# Patient Record
Sex: FEMALE | Race: WHITE
[De-identification: names, ages, dates, MRNs, and addresses within clinical notes are randomized per-mention and may not be internally consistent; named-entity substitution may affect disease eponyms.]

---

## 2019-12-08 ENCOUNTER — HOSPITAL ENCOUNTER (INPATIENT)
Dept: HOSPITAL 54 - ER | Age: 69
LOS: 15 days | Discharge: NURSING, REHAB OR LONG-TERM CARE | DRG: 885 | End: 2019-12-23
Attending: NURSE PRACTITIONER | Admitting: PSYCHIATRY & NEUROLOGY
Payer: MEDICARE

## 2019-12-08 VITALS — HEIGHT: 64 IN | BODY MASS INDEX: 19.12 KG/M2 | WEIGHT: 112 LBS

## 2019-12-08 VITALS — SYSTOLIC BLOOD PRESSURE: 175 MMHG | DIASTOLIC BLOOD PRESSURE: 98 MMHG

## 2019-12-08 VITALS — SYSTOLIC BLOOD PRESSURE: 143 MMHG | DIASTOLIC BLOOD PRESSURE: 86 MMHG

## 2019-12-08 DIAGNOSIS — R73.9: ICD-10-CM

## 2019-12-08 DIAGNOSIS — Z73.6: ICD-10-CM

## 2019-12-08 DIAGNOSIS — F22: ICD-10-CM

## 2019-12-08 DIAGNOSIS — I10: ICD-10-CM

## 2019-12-08 DIAGNOSIS — F29: Primary | ICD-10-CM

## 2019-12-08 DIAGNOSIS — E86.0: ICD-10-CM

## 2019-12-08 DIAGNOSIS — F03.90: ICD-10-CM

## 2019-12-08 DIAGNOSIS — E78.5: ICD-10-CM

## 2019-12-08 DIAGNOSIS — M19.90: ICD-10-CM

## 2019-12-08 DIAGNOSIS — F41.9: ICD-10-CM

## 2019-12-08 DIAGNOSIS — N39.0: ICD-10-CM

## 2019-12-08 LAB
ALBUMIN SERPL BCP-MCNC: 3.7 G/DL (ref 3.4–5)
ALP SERPL-CCNC: 96 U/L (ref 46–116)
ALT SERPL W P-5'-P-CCNC: 26 U/L (ref 12–78)
APAP SERPL-MCNC: 0 UG/ML (ref 10–30)
AST SERPL W P-5'-P-CCNC: 16 U/L (ref 15–37)
BASOPHILS # BLD AUTO: 0 /CMM (ref 0–0.2)
BASOPHILS NFR BLD AUTO: 0.4 % (ref 0–2)
BILIRUB DIRECT SERPL-MCNC: 0.1 MG/DL (ref 0–0.2)
BILIRUB SERPL-MCNC: 0.4 MG/DL (ref 0.2–1)
BUN SERPL-MCNC: 23 MG/DL (ref 7–18)
CALCIUM SERPL-MCNC: 9.4 MG/DL (ref 8.5–10.1)
CHLORIDE SERPL-SCNC: 104 MMOL/L (ref 98–107)
CO2 SERPL-SCNC: 33 MMOL/L (ref 21–32)
CREAT SERPL-MCNC: 0.9 MG/DL (ref 0.6–1.3)
EOSINOPHIL NFR BLD AUTO: 1.7 % (ref 0–6)
ETHANOL SERPL-MCNC: < 3 MG/DL (ref 0–0)
GLUCOSE SERPL-MCNC: 111 MG/DL (ref 74–106)
HCT VFR BLD AUTO: 42 % (ref 33–45)
HGB BLD-MCNC: 13.8 G/DL (ref 11.5–14.8)
LYMPHOCYTES NFR BLD AUTO: 1.3 /CMM (ref 0.8–4.8)
LYMPHOCYTES NFR BLD AUTO: 15.1 % (ref 20–44)
MCHC RBC AUTO-ENTMCNC: 33 G/DL (ref 31–36)
MCV RBC AUTO: 88 FL (ref 82–100)
MONOCYTES NFR BLD AUTO: 0.6 /CMM (ref 0.1–1.3)
MONOCYTES NFR BLD AUTO: 7 % (ref 2–12)
NEUTROPHILS # BLD AUTO: 6.3 /CMM (ref 1.8–8.9)
NEUTROPHILS NFR BLD AUTO: 75.8 % (ref 43–81)
PH UR STRIP: 7 [PH] (ref 5–8)
PLATELET # BLD AUTO: 301 /CMM (ref 150–450)
POTASSIUM SERPL-SCNC: 4 MMOL/L (ref 3.5–5.1)
PROT SERPL-MCNC: 6.9 G/DL (ref 6.4–8.2)
RBC # BLD AUTO: 4.75 MIL/UL (ref 4–5.2)
RBC #/AREA URNS HPF: (no result) /HPF (ref 0–2)
SALICYLATES SERPL-MCNC: 0.7 MG/DL (ref 2.8–20)
SODIUM SERPL-SCNC: 142 MMOL/L (ref 136–145)
UROBILINOGEN UR STRIP-MCNC: 0.2 EU/DL
WBC #/AREA URNS HPF: (no result) /HPF (ref 0–3)
WBC NRBC COR # BLD AUTO: 8.4 K/UL (ref 4.3–11)

## 2019-12-08 PROCEDURE — G0480 DRUG TEST DEF 1-7 CLASSES: HCPCS

## 2019-12-08 RX ADMIN — Medication SCH MG: at 17:14

## 2019-12-08 RX ADMIN — CEPHALEXIN SCH MG: 500 CAPSULE ORAL at 21:37
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
## 2019-12-08 NOTE — NUR
Patient awake alert her sister @ bedside patient calm @ this time noted patient 
able to ambulated to bathroom urine obtained and send to lab

## 2019-12-09 VITALS — SYSTOLIC BLOOD PRESSURE: 146 MMHG | DIASTOLIC BLOOD PRESSURE: 70 MMHG

## 2019-12-09 VITALS — SYSTOLIC BLOOD PRESSURE: 143 MMHG | DIASTOLIC BLOOD PRESSURE: 72 MMHG

## 2019-12-09 VITALS — SYSTOLIC BLOOD PRESSURE: 115 MMHG | DIASTOLIC BLOOD PRESSURE: 62 MMHG

## 2019-12-09 LAB
ALBUMIN SERPL BCP-MCNC: 3.7 G/DL (ref 3.4–5)
ALP SERPL-CCNC: 85 U/L (ref 46–116)
ALT SERPL W P-5'-P-CCNC: 28 U/L (ref 12–78)
AST SERPL W P-5'-P-CCNC: 23 U/L (ref 15–37)
BILIRUB SERPL-MCNC: 0.6 MG/DL (ref 0.2–1)
BUN SERPL-MCNC: 17 MG/DL (ref 7–18)
CALCIUM SERPL-MCNC: 9.2 MG/DL (ref 8.5–10.1)
CHLORIDE SERPL-SCNC: 105 MMOL/L (ref 98–107)
CHOLEST SERPL-MCNC: 260 MG/DL (ref ?–200)
CO2 SERPL-SCNC: 28 MMOL/L (ref 21–32)
CREAT SERPL-MCNC: 1 MG/DL (ref 0.6–1.3)
GLUCOSE SERPL-MCNC: 105 MG/DL (ref 74–106)
HDLC SERPL-MCNC: 68 MG/DL (ref 40–60)
LDLC SERPL DIRECT ASSAY-MCNC: 171 MG/DL (ref 0–99)
POTASSIUM SERPL-SCNC: 3.9 MMOL/L (ref 3.5–5.1)
PROT SERPL-MCNC: 6.8 G/DL (ref 6.4–8.2)
SODIUM SERPL-SCNC: 142 MMOL/L (ref 136–145)
TRIGL SERPL-MCNC: 110 MG/DL (ref 30–150)

## 2019-12-09 RX ADMIN — CEPHALEXIN SCH MG: 500 CAPSULE ORAL at 08:38

## 2019-12-09 RX ADMIN — CEPHALEXIN SCH MG: 500 CAPSULE ORAL at 21:29

## 2019-12-09 RX ADMIN — Medication SCH MG: at 08:38

## 2019-12-09 RX ADMIN — BENZTROPINE MESYLATE SCH MG: 1 TABLET ORAL at 13:03

## 2019-12-09 RX ADMIN — BENZTROPINE MESYLATE SCH MG: 1 TABLET ORAL at 16:38

## 2019-12-09 RX ADMIN — Medication SCH MG: at 16:38

## 2019-12-09 NOTE — NUR
PT WAS RECEIVED FR THE MORNING SHIFT IN BED LYING QUIETLY , NO S/S OF PAIN AND DISCOMFORT, 
CONTINUE TO MONITOR PT THROUGHOUT THE SHIFT.

## 2019-12-09 NOTE — NUR
GROUP NOTE: SW encouraged pt to attend group on this present day discussing "discharge 
planning." Pt stated that she wanted to remain in her room and continue eating her sandwich. 
SW attempted to provide intervention but pt refused.

## 2019-12-09 NOTE — NUR
Initial Discharge Plan: Pt is homeless and states that she was living in a hotel or in her 
car. Per pt, she would go back to living with her sister. SW will work with the pt and the 
MD regarding appropriate discharge planning. SW will form a safe and proper discharge.

## 2019-12-09 NOTE — NUR
Family Contact: SW called the pts sister, Cherie ([phone]), and left her a message on 
her voicemail stating that the SW would like to discuss the pts treatment and discharge plan 
with her.

## 2019-12-10 VITALS — DIASTOLIC BLOOD PRESSURE: 66 MMHG | SYSTOLIC BLOOD PRESSURE: 108 MMHG

## 2019-12-10 VITALS — SYSTOLIC BLOOD PRESSURE: 155 MMHG | DIASTOLIC BLOOD PRESSURE: 95 MMHG

## 2019-12-10 VITALS — DIASTOLIC BLOOD PRESSURE: 82 MMHG | SYSTOLIC BLOOD PRESSURE: 145 MMHG

## 2019-12-10 RX ADMIN — Medication SCH MG: at 16:09

## 2019-12-10 RX ADMIN — CEPHALEXIN SCH MG: 500 CAPSULE ORAL at 08:16

## 2019-12-10 RX ADMIN — BENZTROPINE MESYLATE SCH MG: 1 TABLET ORAL at 16:09

## 2019-12-10 RX ADMIN — Medication SCH MG: at 08:16

## 2019-12-10 RX ADMIN — CEPHALEXIN SCH MG: 500 CAPSULE ORAL at 22:10

## 2019-12-10 RX ADMIN — BENZTROPINE MESYLATE SCH MG: 1 TABLET ORAL at 08:16

## 2019-12-10 NOTE — NUR
Family Contact: Pts sister, Cherie ([phone]), called the SW and the pts treatment was 
discussed. SW informed the pts sister that the pts hold may get extended and then discussed 
the Probable Cause hearing that could take place if the pt is not discharged by then. The 
pts sister stated that the pt has been paranoid for years but lately she has been showing 
signs of dementia like their father and has very poor short term memory. Pts sister stated 
that the pt does not want to stay in one place for too long and so if she does not get 
discharged to her home then they will find her short term placement possibly with Extended 
Stay. SW stated that she will discuss the pts case with the MD and provide any updates.

## 2019-12-11 VITALS — DIASTOLIC BLOOD PRESSURE: 69 MMHG | SYSTOLIC BLOOD PRESSURE: 128 MMHG

## 2019-12-11 VITALS — DIASTOLIC BLOOD PRESSURE: 73 MMHG | SYSTOLIC BLOOD PRESSURE: 141 MMHG

## 2019-12-11 VITALS — SYSTOLIC BLOOD PRESSURE: 132 MMHG | DIASTOLIC BLOOD PRESSURE: 71 MMHG

## 2019-12-11 VITALS — DIASTOLIC BLOOD PRESSURE: 71 MMHG | SYSTOLIC BLOOD PRESSURE: 132 MMHG

## 2019-12-11 RX ADMIN — BENZTROPINE MESYLATE SCH MG: 1 TABLET ORAL at 08:20

## 2019-12-11 RX ADMIN — Medication SCH MG: at 16:41

## 2019-12-11 RX ADMIN — Medication SCH MG: at 08:20

## 2019-12-11 RX ADMIN — CEPHALEXIN SCH MG: 500 CAPSULE ORAL at 08:20

## 2019-12-11 RX ADMIN — BENZTROPINE MESYLATE SCH MG: 1 TABLET ORAL at 16:41

## 2019-12-11 NOTE — NUR
GPS RN NOTES

RECEIVED SITTING COMFORTABLY IN THE DINING ROOM,TALKING WITH SOMEBODY.A/O X2-3,ABLE TO 
VERBALIZED NEEDS.AMBULATE WITH STEADY GAIT.MED COMPLIANT,REDIRECTABLE.WILL CONTINUE TO 
MONITOR BEHAVIOR AND MANAGE ACCORDINGLY.

## 2019-12-11 NOTE — NUR
SNF Referral: YESSICA faxed a referral to [facility] with attn to Anne to the fax 
number: [phone].

## 2019-12-11 NOTE — NUR
Family Contact: Pts sister, Hortensia ([phone]), called the SW and stated that she agrees 
that the pt could benefit from a skilled nursing facility with a memory care unit before 
moving the pt to an Assisted Living type of facility. She stated that she also believes that 
the pt should not be driving and the SW stated that she would fill out a DMV report.

## 2019-12-11 NOTE — NUR
Family Contact: Pts sister, Cherie ([phone]), called the SW and stated that had spoken 
to her sister and stated that the pt would benefit from a skilled nursing facility.

## 2019-12-12 VITALS — SYSTOLIC BLOOD PRESSURE: 131 MMHG | DIASTOLIC BLOOD PRESSURE: 84 MMHG

## 2019-12-12 VITALS — SYSTOLIC BLOOD PRESSURE: 134 MMHG | DIASTOLIC BLOOD PRESSURE: 67 MMHG

## 2019-12-12 VITALS — DIASTOLIC BLOOD PRESSURE: 84 MMHG | SYSTOLIC BLOOD PRESSURE: 113 MMHG

## 2019-12-12 RX ADMIN — Medication SCH MG: at 08:16

## 2019-12-12 RX ADMIN — Medication SCH MG: at 16:41

## 2019-12-12 RX ADMIN — BENZTROPINE MESYLATE SCH MG: 1 TABLET ORAL at 16:41

## 2019-12-12 RX ADMIN — Medication SCH MG: at 08:18

## 2019-12-12 RX ADMIN — BENZTROPINE MESYLATE SCH MG: 1 TABLET ORAL at 08:20

## 2019-12-12 NOTE — NUR
SNF Contact: Anne ([phone]) from [facility] contacted the SW and stated that 
the pt was accepted to their facility.

## 2019-12-13 VITALS — SYSTOLIC BLOOD PRESSURE: 144 MMHG | DIASTOLIC BLOOD PRESSURE: 84 MMHG

## 2019-12-13 VITALS — SYSTOLIC BLOOD PRESSURE: 109 MMHG | DIASTOLIC BLOOD PRESSURE: 66 MMHG

## 2019-12-13 VITALS — SYSTOLIC BLOOD PRESSURE: 119 MMHG | DIASTOLIC BLOOD PRESSURE: 69 MMHG

## 2019-12-13 RX ADMIN — BENZTROPINE MESYLATE SCH MG: 1 TABLET ORAL at 18:14

## 2019-12-13 RX ADMIN — Medication SCH MG: at 18:14

## 2019-12-13 RX ADMIN — Medication SCH MG: at 08:21

## 2019-12-13 RX ADMIN — RISPERIDONE SCH MG: 0.5 TABLET, ORALLY DISINTEGRATING ORAL at 22:08

## 2019-12-13 RX ADMIN — BENZTROPINE MESYLATE SCH MG: 1 TABLET ORAL at 08:23

## 2019-12-13 NOTE — NUR
SISTER SCHUYLER CAME TO VISIT PATIENT AND TOOK BELONGINGS HOME INCLUDIN- [employer]

1- CREDIT CARD

1- DL

$9.00 IN LANZA

CAR KEYS

1-YELLOW COLORED NECKLESS

1- YELLOW COLORED RING WITH STONE

1- PASSPORT

 MEDICATIONS REMAIN IN PHARMACY AND BLACK PURSE IS IN PT LOCKER AT BEDSIDE.

## 2019-12-13 NOTE — NUR
CLOSING

PT KEPT SAFE ALL SHIFT COMPLIANT WITH MEDICATION PLAN . WILL ENDORSE CARE TO PM SHIFT RN FOR 
CONTINUITY OF CARE.

## 2019-12-13 NOTE — NUR
Family Contact: SW met with the pts sister, Cherie ([phone]), and went over the hearing 
and the pts discharge. Pts sister stated that she will visit [facility] today once 
she is done visiting with the pt.

## 2019-12-13 NOTE — NUR
Family Contact: Pts sister, Cherie ([phone]), called the SW and the SW stated that the 
pt was accepted to [facility] and she stated that she would visit the facility 
today and determine whether or not the placement would be appropriate for the pt in this 
condition.

## 2019-12-13 NOTE — NUR
INITIAL

RECEIVED COMFORTABLY IN BED.A/O X2-3,ABLE TO VERBALIZED NEEDS.PT ,RE-DIRECTABLE.WILL 
CONTINUE TO MONITOR BEHAVIOR AND MANAGE ACCORDINGLY. PT SCHEDULED FOR DISCHARGE TO [facility] TODAY

## 2019-12-14 VITALS — DIASTOLIC BLOOD PRESSURE: 88 MMHG | SYSTOLIC BLOOD PRESSURE: 156 MMHG

## 2019-12-14 VITALS — SYSTOLIC BLOOD PRESSURE: 156 MMHG | DIASTOLIC BLOOD PRESSURE: 79 MMHG

## 2019-12-14 RX ADMIN — BENZTROPINE MESYLATE SCH MG: 1 TABLET ORAL at 08:06

## 2019-12-14 RX ADMIN — Medication SCH MG: at 08:05

## 2019-12-14 RX ADMIN — BENZTROPINE MESYLATE SCH MG: 1 TABLET ORAL at 17:06

## 2019-12-14 RX ADMIN — TEMAZEPAM PRN MG: 7.5 CAPSULE ORAL at 00:44

## 2019-12-14 RX ADMIN — RISPERIDONE SCH MG: 0.5 TABLET, ORALLY DISINTEGRATING ORAL at 21:06

## 2019-12-14 RX ADMIN — Medication SCH MG: at 17:06

## 2019-12-14 NOTE — NUR
RN NOTES



PATIENT ATTEMPTED TO GET OUT OF THE UNIT. PATIENT FOLLOWED A HOSPITAL STAFF AS THE STAFF WAS 
GOING OUT OF THE UNIT. WILL CONTINUE TO MONITOR ACCORDINGLY

## 2019-12-14 NOTE — NUR
RN NOTES



OFFERED RESTORIL 7.5MG BUT PATIENT REFUSED. EXPLAINED THE RISK AND BENEFITS, BUT PATIENT 
CONTINUES TO REFUSE.

## 2019-12-15 VITALS — SYSTOLIC BLOOD PRESSURE: 144 MMHG | DIASTOLIC BLOOD PRESSURE: 96 MMHG

## 2019-12-15 VITALS — SYSTOLIC BLOOD PRESSURE: 162 MMHG | DIASTOLIC BLOOD PRESSURE: 87 MMHG

## 2019-12-15 VITALS — DIASTOLIC BLOOD PRESSURE: 85 MMHG | SYSTOLIC BLOOD PRESSURE: 132 MMHG

## 2019-12-15 RX ADMIN — RISPERIDONE SCH MG: 0.5 TABLET, ORALLY DISINTEGRATING ORAL at 21:39

## 2019-12-15 RX ADMIN — BENZTROPINE MESYLATE SCH MG: 1 TABLET ORAL at 08:21

## 2019-12-15 RX ADMIN — Medication SCH MG: at 08:20

## 2019-12-15 RX ADMIN — BENZTROPINE MESYLATE SCH MG: 1 TABLET ORAL at 18:12

## 2019-12-15 RX ADMIN — Medication SCH MG: at 08:21

## 2019-12-15 RX ADMIN — Medication SCH MG: at 18:11

## 2019-12-16 VITALS — SYSTOLIC BLOOD PRESSURE: 136 MMHG | DIASTOLIC BLOOD PRESSURE: 81 MMHG

## 2019-12-16 VITALS — SYSTOLIC BLOOD PRESSURE: 116 MMHG | DIASTOLIC BLOOD PRESSURE: 77 MMHG

## 2019-12-16 VITALS — DIASTOLIC BLOOD PRESSURE: 86 MMHG | SYSTOLIC BLOOD PRESSURE: 122 MMHG

## 2019-12-16 RX ADMIN — LORAZEPAM PRN MG: 0.5 TABLET ORAL at 09:17

## 2019-12-16 RX ADMIN — BENZTROPINE MESYLATE SCH MG: 1 TABLET ORAL at 16:28

## 2019-12-16 RX ADMIN — Medication SCH MG: at 09:18

## 2019-12-16 RX ADMIN — BENZTROPINE MESYLATE SCH MG: 1 TABLET ORAL at 09:18

## 2019-12-16 RX ADMIN — Medication SCH MG: at 09:17

## 2019-12-16 RX ADMIN — Medication SCH MG: at 16:28

## 2019-12-16 RX ADMIN — RISPERIDONE SCH MG: 0.5 TABLET, ORALLY DISINTEGRATING ORAL at 21:07

## 2019-12-16 NOTE — NUR
Family Contact: Pts sister, Cherie ([phone]), called the SW and discussed the pts 
discharge to [facility]. SW stated that the MD has not come in yet and will 
determine her discharge date once she sees the pt. SW stated that she will call her back.

## 2019-12-16 NOTE — NUR
Group Activity:

Goal:

Patient will attend group today at 1:00 pm in the activities room and participate and/or 
actively listen to peers and be respectful. 

Intervention:

SW facilitated group session with patients regarding Holiday Season Sensory activity. SW 
explored what tastes, smells, sounds, and sights come to mind when thinking about the 
holiday season and gratefulness. SW explored 1 good holiday memory, 1 thing you are grateful 
for, 1 thing you will let go of, and 1 thing you want to improve.

Response:

Patient was agreeable to participating in group session. The patient was pleasant, made 
appropriate eye contact and was supportive to her peers. The patient shared some great 
memories with the group discussing food, family traditions and that she is grateful for her 
sisters as they are very supportive.

## 2019-12-17 VITALS — DIASTOLIC BLOOD PRESSURE: 69 MMHG | SYSTOLIC BLOOD PRESSURE: 121 MMHG

## 2019-12-17 VITALS — SYSTOLIC BLOOD PRESSURE: 136 MMHG | DIASTOLIC BLOOD PRESSURE: 66 MMHG

## 2019-12-17 VITALS — SYSTOLIC BLOOD PRESSURE: 131 MMHG | DIASTOLIC BLOOD PRESSURE: 76 MMHG

## 2019-12-17 LAB
ALBUMIN SERPL BCP-MCNC: 3.9 G/DL (ref 3.4–5)
ALP SERPL-CCNC: 94 U/L (ref 46–116)
ALT SERPL W P-5'-P-CCNC: 22 U/L (ref 12–78)
APPEARANCE UR: (no result)
AST SERPL W P-5'-P-CCNC: 19 U/L (ref 15–37)
BASOPHILS # BLD AUTO: 0 /CMM (ref 0–0.2)
BASOPHILS NFR BLD AUTO: 0.5 % (ref 0–2)
BILIRUB SERPL-MCNC: 0.7 MG/DL (ref 0.2–1)
BILIRUB UR QL STRIP: NEGATIVE
BUN SERPL-MCNC: 33 MG/DL (ref 7–18)
CALCIUM SERPL-MCNC: 9.9 MG/DL (ref 8.5–10.1)
CHLORIDE SERPL-SCNC: 100 MMOL/L (ref 98–107)
CO2 SERPL-SCNC: 33 MMOL/L (ref 21–32)
COLOR UR: YELLOW
CREAT SERPL-MCNC: 1.1 MG/DL (ref 0.6–1.3)
EOSINOPHIL NFR BLD AUTO: 2.4 % (ref 0–6)
GLUCOSE SERPL-MCNC: 115 MG/DL (ref 74–106)
GLUCOSE UR STRIP-MCNC: NEGATIVE MG/DL
HCT VFR BLD AUTO: 39 % (ref 33–45)
HGB BLD-MCNC: 12.8 G/DL (ref 11.5–14.8)
HGB UR QL STRIP: (no result) ERY/UL
KETONES UR STRIP-MCNC: NEGATIVE MG/DL
LEUKOCYTE ESTERASE UR QL STRIP: (no result)
LYMPHOCYTES NFR BLD AUTO: 1.2 /CMM (ref 0.8–4.8)
LYMPHOCYTES NFR BLD AUTO: 19.7 % (ref 20–44)
MAGNESIUM SERPL-MCNC: 2.1 MG/DL (ref 1.8–2.4)
MCHC RBC AUTO-ENTMCNC: 33 G/DL (ref 31–36)
MCV RBC AUTO: 88 FL (ref 82–100)
MONOCYTES NFR BLD AUTO: 0.6 /CMM (ref 0.1–1.3)
MONOCYTES NFR BLD AUTO: 9.9 % (ref 2–12)
NEUTROPHILS # BLD AUTO: 4.3 /CMM (ref 1.8–8.9)
NEUTROPHILS NFR BLD AUTO: 67.5 % (ref 43–81)
NITRITE UR QL STRIP: NEGATIVE
PH UR STRIP: 5.5 [PH] (ref 5–8)
PLATELET # BLD AUTO: 235 /CMM (ref 150–450)
POTASSIUM SERPL-SCNC: 4.2 MMOL/L (ref 3.5–5.1)
PROT SERPL-MCNC: 7.2 G/DL (ref 6.4–8.2)
PROT UR QL STRIP: NEGATIVE MG/DL
RBC # BLD AUTO: 4.41 MIL/UL (ref 4–5.2)
RBC #/AREA URNS HPF: (no result) /HPF (ref 0–2)
SODIUM SERPL-SCNC: 140 MMOL/L (ref 136–145)
UROBILINOGEN UR STRIP-MCNC: 0.2 EU/DL
WBC #/AREA URNS HPF: (no result) /HPF (ref 0–3)
WBC NRBC COR # BLD AUTO: 6.3 K/UL (ref 4.3–11)

## 2019-12-17 RX ADMIN — Medication SCH MG: at 09:34

## 2019-12-17 RX ADMIN — BENZTROPINE MESYLATE SCH MG: 1 TABLET ORAL at 16:23

## 2019-12-17 RX ADMIN — LORAZEPAM PRN MG: 0.5 TABLET ORAL at 16:23

## 2019-12-17 RX ADMIN — Medication SCH MG: at 17:57

## 2019-12-17 RX ADMIN — Medication SCH MG: at 16:23

## 2019-12-17 RX ADMIN — BENZTROPINE MESYLATE SCH MG: 1 TABLET ORAL at 09:35

## 2019-12-17 RX ADMIN — RISPERIDONE SCH MG: 0.5 TABLET, ORALLY DISINTEGRATING ORAL at 21:33

## 2019-12-17 NOTE — NUR
RN NOTES



FOLLOWED UP WITH LABORATORY FOR URINALYSIS RESULT. PER IVY AT LAB, THEY DID NOT RECEIVED 
ANY SPECIMEN LAST NIGHT.



ATTEMPTED TO COLLECT URINE SAMPLE TODAY WITH DR. JACINTO AT ROOM. PATIENT APPARENTLY VERY 
CONFUSED AND UNABLE TO FOLLOW DIRECTIONS.



WILL TRY TO COLLECT AGAIN.

## 2019-12-17 NOTE — NUR
RN NOTES



ATTEMPTED TO COLLECT URINE AGAIN, BUT PATIENT IS VERY PARANOID AND VERY ANXIOUS AND DOES NOT 
WANT TO COOPERATE.

## 2019-12-17 NOTE — NUR
Family Contact: Pts sister, Cherie ([phone]), called the SW and informed her that she 
had a conversation with the MD the previous day about being more involved with the pts care. 
She expressed her concerns to the MD and stated that she wanted to meet with her in person 
with the pt to understand how the pt is being evaluated. SW stated that the pts MD had 
informed her that she will be calling the family once again today.

## 2019-12-17 NOTE — NUR
Contact with the MD: YESSICA spoke with the pts MD, Dr. Conroy, regarding the pts discharge and 
was informed that the pts family members have been contacting the unit and expressing their 
concerns with the medications and that the pt has been stating that she is on a cruise and 
that she has not been able to make complete statements anymore. MD stated that she will be 
contacting the family around 10am. Pt will still be discharged to [facility].

## 2019-12-18 VITALS — DIASTOLIC BLOOD PRESSURE: 87 MMHG | SYSTOLIC BLOOD PRESSURE: 147 MMHG

## 2019-12-18 VITALS — SYSTOLIC BLOOD PRESSURE: 129 MMHG | DIASTOLIC BLOOD PRESSURE: 86 MMHG

## 2019-12-18 VITALS — SYSTOLIC BLOOD PRESSURE: 135 MMHG | DIASTOLIC BLOOD PRESSURE: 84 MMHG

## 2019-12-18 RX ADMIN — Medication SCH MG: at 23:00

## 2019-12-18 RX ADMIN — Medication SCH MG: at 17:11

## 2019-12-18 RX ADMIN — RISPERIDONE SCH MG: 0.5 TABLET, ORALLY DISINTEGRATING ORAL at 21:57

## 2019-12-18 RX ADMIN — ATORVASTATIN CALCIUM SCH MG: 10 TABLET, FILM COATED ORAL at 08:30

## 2019-12-18 RX ADMIN — Medication SCH MG: at 06:28

## 2019-12-18 RX ADMIN — BENZTROPINE MESYLATE SCH MG: 1 TABLET ORAL at 17:11

## 2019-12-18 RX ADMIN — Medication SCH MG: at 00:00

## 2019-12-18 RX ADMIN — Medication SCH MG: at 09:23

## 2019-12-18 RX ADMIN — Medication SCH MG: at 12:37

## 2019-12-18 RX ADMIN — ATORVASTATIN CALCIUM SCH MG: 10 TABLET, FILM COATED ORAL at 21:57

## 2019-12-18 RX ADMIN — Medication SCH MG: at 09:11

## 2019-12-18 RX ADMIN — BENZTROPINE MESYLATE SCH MG: 1 TABLET ORAL at 09:24

## 2019-12-19 VITALS — DIASTOLIC BLOOD PRESSURE: 84 MMHG | SYSTOLIC BLOOD PRESSURE: 134 MMHG

## 2019-12-19 VITALS — SYSTOLIC BLOOD PRESSURE: 135 MMHG | DIASTOLIC BLOOD PRESSURE: 81 MMHG

## 2019-12-19 VITALS — DIASTOLIC BLOOD PRESSURE: 83 MMHG | SYSTOLIC BLOOD PRESSURE: 149 MMHG

## 2019-12-19 RX ADMIN — RISPERIDONE SCH MG: 0.5 TABLET, ORALLY DISINTEGRATING ORAL at 21:19

## 2019-12-19 RX ADMIN — BENZTROPINE MESYLATE SCH MG: 1 TABLET ORAL at 09:00

## 2019-12-19 RX ADMIN — Medication SCH MG: at 23:35

## 2019-12-19 RX ADMIN — Medication SCH MG: at 16:33

## 2019-12-19 RX ADMIN — Medication SCH MG: at 08:05

## 2019-12-19 RX ADMIN — Medication SCH MG: at 12:14

## 2019-12-19 RX ADMIN — Medication SCH MG: at 09:00

## 2019-12-19 RX ADMIN — BENZTROPINE MESYLATE SCH MG: 1 TABLET ORAL at 08:04

## 2019-12-19 RX ADMIN — Medication SCH MG: at 17:07

## 2019-12-19 RX ADMIN — TEMAZEPAM PRN MG: 7.5 CAPSULE ORAL at 00:13

## 2019-12-19 RX ADMIN — TEMAZEPAM PRN MG: 7.5 CAPSULE ORAL at 23:38

## 2019-12-19 RX ADMIN — ATORVASTATIN CALCIUM SCH MG: 10 TABLET, FILM COATED ORAL at 21:19

## 2019-12-19 RX ADMIN — Medication SCH MG: at 05:55

## 2019-12-19 RX ADMIN — BENZTROPINE MESYLATE SCH MG: 1 TABLET ORAL at 16:33

## 2019-12-19 NOTE — NUR
PRN RESTORIL GIVEN



PATIENT IS UNABLE TO SLEEP, IN & OUT OF HER HER BED MULTIPLE TIMES. PRN RESTORIL 7.5 MG PO 
GIVEN. WILL REASSESS FOR EFFECTIVENESS.

## 2019-12-20 VITALS — DIASTOLIC BLOOD PRESSURE: 69 MMHG | SYSTOLIC BLOOD PRESSURE: 111 MMHG

## 2019-12-20 VITALS — SYSTOLIC BLOOD PRESSURE: 127 MMHG | DIASTOLIC BLOOD PRESSURE: 76 MMHG

## 2019-12-20 VITALS — DIASTOLIC BLOOD PRESSURE: 88 MMHG | SYSTOLIC BLOOD PRESSURE: 129 MMHG

## 2019-12-20 RX ADMIN — BENZTROPINE MESYLATE SCH MG: 1 TABLET ORAL at 17:00

## 2019-12-20 RX ADMIN — Medication SCH MG: at 08:30

## 2019-12-20 RX ADMIN — TEMAZEPAM PRN MG: 7.5 CAPSULE ORAL at 21:50

## 2019-12-20 RX ADMIN — BENZTROPINE MESYLATE SCH MG: 1 TABLET ORAL at 08:30

## 2019-12-20 RX ADMIN — RISPERIDONE SCH MG: 0.5 TABLET, ORALLY DISINTEGRATING ORAL at 21:00

## 2019-12-20 RX ADMIN — ATORVASTATIN CALCIUM SCH MG: 10 TABLET, FILM COATED ORAL at 21:01

## 2019-12-20 RX ADMIN — Medication SCH MG: at 08:31

## 2019-12-20 RX ADMIN — Medication SCH MG: at 05:41

## 2019-12-20 RX ADMIN — Medication SCH MG: at 12:27

## 2019-12-20 RX ADMIN — Medication SCH MG: at 17:01

## 2019-12-20 RX ADMIN — Medication SCH MG: at 17:00

## 2019-12-20 NOTE — NUR
Family Contact: YESSICA called the pts sister, Cherie ([phone]), and informed her that the 
pt is going to be discharged to [facility] on Monday. She stated that she accepts 
the discharge plan.

## 2019-12-21 VITALS — DIASTOLIC BLOOD PRESSURE: 77 MMHG | SYSTOLIC BLOOD PRESSURE: 139 MMHG

## 2019-12-21 VITALS — SYSTOLIC BLOOD PRESSURE: 118 MMHG | DIASTOLIC BLOOD PRESSURE: 57 MMHG

## 2019-12-21 VITALS — SYSTOLIC BLOOD PRESSURE: 135 MMHG | DIASTOLIC BLOOD PRESSURE: 80 MMHG

## 2019-12-21 RX ADMIN — ATORVASTATIN CALCIUM SCH MG: 10 TABLET, FILM COATED ORAL at 21:26

## 2019-12-21 RX ADMIN — Medication SCH MG: at 17:55

## 2019-12-21 RX ADMIN — BENZTROPINE MESYLATE SCH MG: 1 TABLET ORAL at 17:55

## 2019-12-21 RX ADMIN — RISPERIDONE SCH MG: 0.5 TABLET, ORALLY DISINTEGRATING ORAL at 21:26

## 2019-12-21 RX ADMIN — Medication SCH MG: at 00:20

## 2019-12-21 RX ADMIN — Medication SCH MG: at 09:06

## 2019-12-21 RX ADMIN — Medication SCH MG: at 09:12

## 2019-12-21 RX ADMIN — TEMAZEPAM PRN MG: 7.5 CAPSULE ORAL at 21:26

## 2019-12-21 RX ADMIN — TEMAZEPAM PRN MG: 7.5 CAPSULE ORAL at 20:48

## 2019-12-21 RX ADMIN — Medication SCH MG: at 13:37

## 2019-12-21 RX ADMIN — BENZTROPINE MESYLATE SCH MG: 1 TABLET ORAL at 09:07

## 2019-12-21 RX ADMIN — Medication SCH MG: at 07:03

## 2019-12-21 NOTE — NUR
GPS/RN NOTE:



AWAKE, CONFUSED, AMBULATING AROUND THE UNIT. NO APPARENT DISTRESS NOTED. WILL CONTINUE TO 
MONITOR.

## 2019-12-22 VITALS — SYSTOLIC BLOOD PRESSURE: 109 MMHG | DIASTOLIC BLOOD PRESSURE: 72 MMHG

## 2019-12-22 VITALS — SYSTOLIC BLOOD PRESSURE: 114 MMHG | DIASTOLIC BLOOD PRESSURE: 74 MMHG

## 2019-12-22 VITALS — SYSTOLIC BLOOD PRESSURE: 138 MMHG | DIASTOLIC BLOOD PRESSURE: 77 MMHG

## 2019-12-22 RX ADMIN — Medication SCH MG: at 17:45

## 2019-12-22 RX ADMIN — BENZTROPINE MESYLATE SCH MG: 1 TABLET ORAL at 08:29

## 2019-12-22 RX ADMIN — BENZTROPINE MESYLATE SCH MG: 1 TABLET ORAL at 17:45

## 2019-12-22 RX ADMIN — ATORVASTATIN CALCIUM SCH MG: 10 TABLET, FILM COATED ORAL at 22:01

## 2019-12-22 RX ADMIN — Medication SCH MG: at 00:00

## 2019-12-22 RX ADMIN — Medication SCH MG: at 05:38

## 2019-12-22 RX ADMIN — Medication SCH MG: at 08:33

## 2019-12-22 RX ADMIN — RISPERIDONE SCH MG: 0.5 TABLET, ORALLY DISINTEGRATING ORAL at 22:01

## 2019-12-22 RX ADMIN — Medication SCH MG: at 08:32

## 2019-12-22 RX ADMIN — TEMAZEPAM PRN MG: 7.5 CAPSULE ORAL at 23:06

## 2019-12-22 RX ADMIN — Medication SCH MG: at 12:16

## 2019-12-22 RX ADMIN — Medication SCH MG: at 23:04

## 2019-12-22 NOTE — NUR
Pt c/o insomnia. Least restrictive measures ineffective. Restoril 7.5 mg po given prn. Will 
continue to monitor.

## 2019-12-23 VITALS — DIASTOLIC BLOOD PRESSURE: 77 MMHG | SYSTOLIC BLOOD PRESSURE: 157 MMHG

## 2019-12-23 VITALS — SYSTOLIC BLOOD PRESSURE: 157 MMHG | DIASTOLIC BLOOD PRESSURE: 77 MMHG

## 2019-12-23 RX ADMIN — Medication SCH MG: at 08:06

## 2019-12-23 RX ADMIN — BENZTROPINE MESYLATE SCH MG: 1 TABLET ORAL at 08:08

## 2019-12-23 RX ADMIN — Medication SCH MG: at 12:58

## 2019-12-23 RX ADMIN — LORAZEPAM PRN MG: 0.5 TABLET ORAL at 01:09

## 2019-12-23 RX ADMIN — Medication SCH MG: at 06:01

## 2019-12-23 NOTE — NUR
Discharge Note: Pt was discharged to [facility] ([facility]) located at [address] [place] [address]; ([phone]). Pt was transported via [facility] at 1PM. Pts 
sister, Cherie ([phone]), was informed of the discharge. Upon discharge, the pt 
appeared to be in a euthymic mood and presented with a calm affect. Pt denied both suicidal 
and homicidal ideation as well as auditory and visual hallucinations. Pt will be under the 
care of psychiatrist, Dr. Justine Conroy, located at [address], [place] [address]; [phone] and internist, Dr. Riaz Reyna, located at [address] [place] [address]; [phone].

## 2019-12-23 NOTE — NUR
RN NOTE DC NOTE- PT DC TO SKILLED NURSING FACILITY VIA AMBULANCE . PT VS STABLE, CALM 
COOPERATIVE, ALERT ORIENTED TO PERSON PLACE. DENIES SI HI AH VH AT TIME OF DC. REPORT GIVEN 
TO ALONZO AT FACILITY. VALUABLES RETURNED TO PT AND CAREGIVERS. ARMBAND ID REMOVED.

## 2019-12-23 NOTE — NUR
Pt c/o anxiety. Least restrictive measures ineffective. Ativan 0.5mg po prn given as 
ordered. Will continue to monitor.

## 2019-12-23 NOTE — NUR
Post 1 hour Restoril ineffective. Pt still awake. States, " I cant sleep because i was 
asleep a lot today during the day". Quiet and calm environment provided. Lights dimmed. Pt 
is sitting on bed quietly. Will continue to monitor.